# Patient Record
Sex: MALE | Race: WHITE | NOT HISPANIC OR LATINO | URBAN - METROPOLITAN AREA
[De-identification: names, ages, dates, MRNs, and addresses within clinical notes are randomized per-mention and may not be internally consistent; named-entity substitution may affect disease eponyms.]

---

## 2017-10-04 ENCOUNTER — EMERGENCY (EMERGENCY)
Facility: HOSPITAL | Age: 62
LOS: 1 days | Discharge: LEFT BEFORE DISCHARGE | End: 2017-10-04
Attending: EMERGENCY MEDICINE | Admitting: EMERGENCY MEDICINE
Payer: COMMERCIAL

## 2017-10-04 VITALS
HEART RATE: 77 BPM | SYSTOLIC BLOOD PRESSURE: 153 MMHG | RESPIRATION RATE: 18 BRPM | DIASTOLIC BLOOD PRESSURE: 92 MMHG | OXYGEN SATURATION: 100 %

## 2017-10-04 VITALS
HEART RATE: 140 BPM | OXYGEN SATURATION: 98 % | TEMPERATURE: 99 F | DIASTOLIC BLOOD PRESSURE: 90 MMHG | RESPIRATION RATE: 18 BRPM | SYSTOLIC BLOOD PRESSURE: 175 MMHG

## 2017-10-04 DIAGNOSIS — R07.89 OTHER CHEST PAIN: ICD-10-CM

## 2017-10-04 DIAGNOSIS — R06.02 SHORTNESS OF BREATH: ICD-10-CM

## 2017-10-04 DIAGNOSIS — I48.92 UNSPECIFIED ATRIAL FLUTTER: ICD-10-CM

## 2017-10-04 LAB
ALBUMIN SERPL ELPH-MCNC: 3.9 G/DL — SIGNIFICANT CHANGE UP (ref 3.4–5)
ALP SERPL-CCNC: 72 U/L — SIGNIFICANT CHANGE UP (ref 40–120)
ALT FLD-CCNC: 162 U/L — HIGH (ref 12–42)
ANION GAP SERPL CALC-SCNC: 9 MMOL/L — SIGNIFICANT CHANGE UP (ref 9–16)
APTT BLD: 35.2 SEC — SIGNIFICANT CHANGE UP (ref 27.5–36.5)
AST SERPL-CCNC: 95 U/L — HIGH (ref 15–37)
BASOPHILS NFR BLD AUTO: 0.6 % — SIGNIFICANT CHANGE UP (ref 0–2)
BILIRUB SERPL-MCNC: 0.9 MG/DL — SIGNIFICANT CHANGE UP (ref 0.2–1.2)
BUN SERPL-MCNC: 30 MG/DL — HIGH (ref 7–23)
CALCIUM SERPL-MCNC: 8.7 MG/DL — SIGNIFICANT CHANGE UP (ref 8.5–10.5)
CHLORIDE SERPL-SCNC: 106 MMOL/L — SIGNIFICANT CHANGE UP (ref 96–108)
CK MB BLD-MCNC: 1.43 % — SIGNIFICANT CHANGE UP
CK MB CFR SERPL CALC: 4.6 NG/ML — HIGH (ref 0.5–3.6)
CK SERPL-CCNC: 321 U/L — HIGH (ref 39–308)
CO2 SERPL-SCNC: 23 MMOL/L — SIGNIFICANT CHANGE UP (ref 22–31)
CREAT SERPL-MCNC: 1.05 MG/DL — SIGNIFICANT CHANGE UP (ref 0.5–1.3)
EOSINOPHIL NFR BLD AUTO: 0.6 % — SIGNIFICANT CHANGE UP (ref 0–6)
ETHANOL SERPL-MCNC: < 3 MG/DL — SIGNIFICANT CHANGE UP
GLUCOSE SERPL-MCNC: 111 MG/DL — HIGH (ref 70–99)
HCT VFR BLD CALC: 49 % — SIGNIFICANT CHANGE UP (ref 39–50)
HGB BLD-MCNC: 16.9 G/DL — SIGNIFICANT CHANGE UP (ref 13–17)
IMM GRANULOCYTES NFR BLD AUTO: 0.7 % — SIGNIFICANT CHANGE UP (ref 0–1.5)
INR BLD: 1.11 — SIGNIFICANT CHANGE UP (ref 0.88–1.16)
LYMPHOCYTES # BLD AUTO: 19.7 % — SIGNIFICANT CHANGE UP (ref 13–44)
MAGNESIUM SERPL-MCNC: 2 MG/DL — SIGNIFICANT CHANGE UP (ref 1.6–2.6)
MCHC RBC-ENTMCNC: 28.9 PG — SIGNIFICANT CHANGE UP (ref 27–34)
MCHC RBC-ENTMCNC: 34.5 G/DL — SIGNIFICANT CHANGE UP (ref 32–36)
MCV RBC AUTO: 83.9 FL — SIGNIFICANT CHANGE UP (ref 80–100)
MONOCYTES NFR BLD AUTO: 6.7 % — SIGNIFICANT CHANGE UP (ref 2–14)
NEUTROPHILS NFR BLD AUTO: 71.7 % — SIGNIFICANT CHANGE UP (ref 43–77)
NT-PROBNP SERPL-SCNC: <5 PG/ML — SIGNIFICANT CHANGE UP
PCP SPEC-MCNC: SIGNIFICANT CHANGE UP
PLATELET # BLD AUTO: 187 K/UL — SIGNIFICANT CHANGE UP (ref 150–400)
POTASSIUM SERPL-MCNC: 5.4 MMOL/L — HIGH (ref 3.5–5.3)
POTASSIUM SERPL-SCNC: 5.4 MMOL/L — HIGH (ref 3.5–5.3)
PROT SERPL-MCNC: 7.7 G/DL — SIGNIFICANT CHANGE UP (ref 6.4–8.2)
PROTHROM AB SERPL-ACNC: 12.2 SEC — SIGNIFICANT CHANGE UP (ref 9.8–12.7)
RBC # BLD: 5.84 M/UL — HIGH (ref 4.2–5.8)
RBC # FLD: 13 % — SIGNIFICANT CHANGE UP (ref 10.3–16.9)
SODIUM SERPL-SCNC: 138 MMOL/L — SIGNIFICANT CHANGE UP (ref 132–145)
TROPONIN I SERPL-MCNC: <0.017 NG/ML — LOW (ref 0.02–0.06)
WBC # BLD: 8.6 K/UL — SIGNIFICANT CHANGE UP (ref 3.8–10.5)
WBC # FLD AUTO: 8.6 K/UL — SIGNIFICANT CHANGE UP (ref 3.8–10.5)

## 2017-10-04 PROCEDURE — 93010 ELECTROCARDIOGRAM REPORT: CPT | Mod: 59

## 2017-10-04 PROCEDURE — 93010 ELECTROCARDIOGRAM REPORT: CPT | Mod: 77,59

## 2017-10-04 PROCEDURE — 71010: CPT | Mod: 26

## 2017-10-04 RX ORDER — DILTIAZEM HCL 120 MG
1 CAPSULE, EXT RELEASE 24 HR ORAL
Qty: 9 | Refills: 0 | OUTPATIENT
Start: 2017-10-04 | End: 2017-10-07

## 2017-10-04 RX ORDER — APIXABAN 2.5 MG/1
5 TABLET, FILM COATED ORAL ONCE
Qty: 0 | Refills: 0 | Status: COMPLETED | OUTPATIENT
Start: 2017-10-04 | End: 2017-10-04

## 2017-10-04 RX ORDER — APIXABAN 2.5 MG/1
1 TABLET, FILM COATED ORAL
Qty: 6 | Refills: 0 | OUTPATIENT
Start: 2017-10-04 | End: 2017-10-07

## 2017-10-04 RX ORDER — SODIUM CHLORIDE 9 MG/ML
1000 INJECTION INTRAMUSCULAR; INTRAVENOUS; SUBCUTANEOUS ONCE
Qty: 0 | Refills: 0 | Status: COMPLETED | OUTPATIENT
Start: 2017-10-04 | End: 2017-10-04

## 2017-10-04 RX ORDER — HEPARIN SODIUM 5000 [USP'U]/ML
5000 INJECTION INTRAVENOUS; SUBCUTANEOUS ONCE
Qty: 0 | Refills: 0 | Status: DISCONTINUED | OUTPATIENT
Start: 2017-10-04 | End: 2017-10-04

## 2017-10-04 RX ADMIN — SODIUM CHLORIDE 500 MILLILITER(S): 9 INJECTION INTRAMUSCULAR; INTRAVENOUS; SUBCUTANEOUS at 20:14

## 2017-10-04 RX ADMIN — APIXABAN 5 MILLIGRAM(S): 2.5 TABLET, FILM COATED ORAL at 21:57

## 2017-10-04 NOTE — ED ADULT NURSE NOTE - OBJECTIVE STATEMENT
Pt states shortness of breath on and off since august. Pt states at this moment he has no chest pain and not that much shortness of breath.

## 2017-10-04 NOTE — ED PROVIDER NOTE - OBJECTIVE STATEMENT
62y M presents to ED c/o SOB. Pt states sx began 1.5 hours after a meeting he had. Pt describes sx as "fluttering" in his chest that began 2 months ago, and would occur intermittently. Pt notes family hx of CAD. 62y M presents to ED c/o SOB. Pt states sx began 1.5 hours ago, after a meeting he had. Pt describes sx as "fluttering" in his chest that began 2 months ago, and would occur intermittently. Pt notes family hx of CAD. 62y M presents to ED c/o SOB. Pt states sx began 1.5 hours ago, after a meeting he had. Pt describes sx as "fluttering" in his chest that began 2 months ago, and would occur intermittently. Pt notes family hx of CAD.  Pt denies any chest pain.  Pt reports mild sob with symptoms.  Pt denies any fevers, chills, n/v, back pain, lightheadedness, syncope, leg swelling.

## 2017-10-04 NOTE — ED PROVIDER NOTE - MEDICAL DECISION MAKING DETAILS
pt with no sig pmhx presents with palpitations and sob, reports possibly intermittent since aug however symptoms started tonight aprox 1.5 hours PTA tonight and after a stressful meeting.  Labs, ekg, ivf, and observation.  will give Cardizem and reassess pt with no sig pmhx presents with palpitations and sob, reports possibly intermittent since aug however symptoms started tonight aprox 1.5 hours PTA tonight and after a stressful meeting.  Pt in atrial flutter will order Labs, ekg, ivf, and observation.  will give Cardizem and reassess

## 2017-10-04 NOTE — ED CDU PROVIDER NOTE - DETAILS
see ed note  pt with new onset atrial flutter. plan to keep for rate control and telemetry; cardiology consult and ep consult/intervention

## 2017-10-04 NOTE — ED CDU PROVIDER NOTE - MEDICAL DECISION MAKING DETAILS
Pt informed of all lab and radiology results, demonstrates understanding of results.  Pt wants to be discharged despite being advised STRONGLY and REPEATEDLY to stay for admission.  A&Ox3, speaking clearly and coherently, demonstrates decision-making capacity.  Pt understands that the risks of leaving include, but not limited to, death and/or disability; pt accepts these risks.  Pt understands to be seen by PMD in 1-2 days; pt understands to return as soon as possible for persistence or any worsening of symptoms. Pt given opportunity to ask any and all questions.  Pt understands that the ER diagnosis being given today is a preliminary one, and, like many ER diagnoses, is a preliminary impression, often based on limited information, that may change as current Sx's evolve or new Sx's develop. Pt with new onset atrial flutter, plan was to admit for rate control, ep consultation, anticoagulation for possible intervention. pt did improve while in ED and the told me he spoke again with his friend Dr. Sahni cardiology at [facility] and wants to follow up with him in the morning.  I discussed this with dr sahni via phone and also with our cardiologist dr ordonez and will have pt sign ama and will send with eliquis and Cardizem. Pt to be seen in the AM   Pt informed of all lab and radiology results, demonstrates understanding of results.  Pt wants to be discharged despite being advised STRONGLY and REPEATEDLY to stay for admission.  A&Ox3, speaking clearly and coherently, demonstrates decision-making capacity.  Pt understands that the risks of leaving include, but not limited to, death and/or disability; pt accepts these risks.  Pt understands to be seen by PMD in 1-2 days; pt understands to return as soon as possible for persistence or any worsening of symptoms. Pt given opportunity to ask any and all questions.  Pt understands that the ER diagnosis being given today is a preliminary one, and, like many ER diagnoses, is a preliminary impression, often based on limited information, that may change as current Sx's evolve or new Sx's develop.

## 2017-10-04 NOTE — ED CDU PROVIDER NOTE - OBJECTIVE STATEMENT
62y M presents to ED c/o SOB. Pt states sx began 1.5 hours ago, after a meeting he had. Pt describes sx as "fluttering" in his chest that began 2 months ago, and would occur intermittently. Pt notes family hx of CAD.

## 2017-10-04 NOTE — ED CDU PROVIDER NOTE - CONDUCTED A DETAILED DISCUSSION WITH PATIENT AND/OR GUARDIAN REGARDING, MDM
need for outpatient follow-up/lab results/radiology results/need to admit/return to ED if symptoms worsen, persist or questions arise

## 2017-10-08 PROCEDURE — 99220: CPT | Mod: 25

## 2023-08-28 NOTE — ED ADULT TRIAGE NOTE - NS ED NURSE AMBULANCES
Patient resting in bed. Respirations easy and unlabored. No distress noted. Call light within reach.        Deshawn Gupta RN  08/28/23 [id number] [facility]
